# Patient Record
Sex: MALE | Race: BLACK OR AFRICAN AMERICAN | NOT HISPANIC OR LATINO | Employment: UNEMPLOYED | URBAN - METROPOLITAN AREA
[De-identification: names, ages, dates, MRNs, and addresses within clinical notes are randomized per-mention and may not be internally consistent; named-entity substitution may affect disease eponyms.]

---

## 2019-11-04 ENCOUNTER — HOSPITAL ENCOUNTER (EMERGENCY)
Facility: HOSPITAL | Age: 29
Discharge: HOME | End: 2019-11-04
Attending: EMERGENCY MEDICINE
Payer: MEDICAID

## 2019-11-04 VITALS
BODY MASS INDEX: 27.06 KG/M2 | RESPIRATION RATE: 18 BRPM | WEIGHT: 189 LBS | SYSTOLIC BLOOD PRESSURE: 109 MMHG | HEIGHT: 70 IN | OXYGEN SATURATION: 96 % | HEART RATE: 90 BPM | TEMPERATURE: 99 F | DIASTOLIC BLOOD PRESSURE: 62 MMHG

## 2019-11-04 DIAGNOSIS — Z76.0 MEDICATION REFILL: ICD-10-CM

## 2019-11-04 PROCEDURE — 93005 ELECTROCARDIOGRAM TRACING: CPT

## 2019-11-04 PROCEDURE — 96372 THER/PROPH/DIAG INJ SC/IM: CPT

## 2019-11-04 PROCEDURE — 99284 EMERGENCY DEPT VISIT MOD MDM: CPT | Mod: 25

## 2019-11-04 PROCEDURE — 63600175 PHARM REV CODE 636 W HCPCS: Performed by: EMERGENCY MEDICINE

## 2019-11-04 RX ORDER — HALOPERIDOL DECANOATE 50 MG/ML
150 INJECTION INTRAMUSCULAR
Status: COMPLETED | OUTPATIENT
Start: 2019-11-04 | End: 2019-11-04

## 2019-11-04 RX ADMIN — HALOPERIDOL DECANOATE 150 MG: 50 INJECTION INTRAMUSCULAR at 05:11

## 2019-11-04 NOTE — ED NOTES
Pt and family updated on plan of care. Aware that pharmacy does not carry ordered medication. A cab has been sent to get ordered medication from [facility] in [place]. Pt and family aware of delay and verbalized understanding. Awaiting notification from pharmacy when medication arrives.

## 2019-11-04 NOTE — ED PROVIDER NOTES
Encounter Date: 11/4/2019    SCRIBE #1 NOTE: I, Georgette Gonsales and bettina scribing for, and in the presence of, Mark Bennett MD.       History     Chief Complaint   Patient presents with    Medication Refill     was recieving haldol IM while incarcerated for Bipolar      Time seen by provider: 2:20 PM on 11/04/2019    Carlos Castillo is a 29 y.o. male with PMHx of bipolar disorder who presents to the ED for a medication refill. The patient was receiving monthly haldol doses while incarcerated. The last dose he received was 10/11/2019 (24 days ago). He has an appointment with a psychiatrist in 2 days, but his mother reports that they can't prescribe him any medications on his first visit with the psychiatrist. The patient denies any other symptoms at this time. No PSHx. No known drug allergies noted.    The history is provided by the patient, a parent and medical records.     Review of patient's allergies indicates:  No Known Allergies  History reviewed. No pertinent past medical history.  No past surgical history on file.  History reviewed. No pertinent family history.  Social History     Tobacco Use    Smoking status: Not on file   Substance Use Topics    Alcohol use: Not on file    Drug use: Not on file     Review of Systems   Constitutional: Negative for fever.   HENT: Negative for sore throat.    Respiratory: Negative for shortness of breath.    Cardiovascular: Negative for chest pain.   Gastrointestinal: Negative for nausea.   Genitourinary: Negative for dysuria.   Musculoskeletal: Negative for back pain.   Skin: Negative for rash.   Neurological: Negative for weakness.   Hematological: Does not bruise/bleed easily.       Physical Exam     Initial Vitals [11/04/19 1256]   BP Pulse Resp Temp SpO2   109/62 (!) 123 18 99.1 °F (37.3 °C) 96 %      MAP       --         Physical Exam    Nursing note and vitals reviewed.  Constitutional: He appears well-developed and well-nourished. He is not  diaphoretic. No distress.   HENT:   Head: Normocephalic and atraumatic.   Mouth/Throat: Oropharynx is clear and moist.   Eyes: Conjunctivae are normal.   Neck: Neck supple.   Cardiovascular: Normal rate, regular rhythm, normal heart sounds and intact distal pulses. Exam reveals no gallop and no friction rub.    No murmur heard.  Pulmonary/Chest: Breath sounds normal. He has no wheezes. He has no rhonchi. He has no rales.   Abdominal: Soft. He exhibits no distension. There is no tenderness.   Musculoskeletal: Normal range of motion.   Neurological: He is alert and oriented to person, place, and time.   Skin: No rash noted. No erythema.         ED Course   Procedures  Labs Reviewed - No data to display     ECG Results          EKG 12-lead (In process)  Result time 11/04/19 15:00:38    In process by Interface, Lab In [facility] (11/04/19 15:00:38)                 Narrative:    Test Reason : Z76.0,    Vent. Rate : 090 BPM     Atrial Rate : 090 BPM     P-R Int : 146 ms          QRS Dur : 070 ms      QT Int : 312 ms       P-R-T Axes : 057 063 028 degrees     QTc Int : 381 ms    Normal sinus rhythm  ST elevation, consider early repolarization, pericarditis, or injury  Nonspecific ST and T wave abnormality  Abnormal ECG  No previous ECGs available    Referred By: [email or web address]   SELF           Confirmed By:                             Imaging Results    None          Medical Decision Making:   History:   Old Medical Records: I decided to obtain old medical records.  Independently Interpreted Test(s):   I have ordered and independently interpreted EKG Reading(s) - see prior notes  Clinical Tests:   Medical Tests: Ordered and Reviewed            Scribe Attestation:   Scribe #1: I performed the above scribed service and the documentation accurately describes the services I performed. I attest to the accuracy of the note.    I, Dr. Mark Bennett, personally performed the services described in this documentation. All medical  record entries made by the scribe were at my direction and in my presence.  I have reviewed the chart and agree that the record reflects my personal performance and is accurate and complete. Mark Bennett MD.  10:50 PM 11/04/2019    Carlos Castillo is a 29 y.o. male presenting with request of dosage of monthly depot medication haloperidol decanoate.  This was obtained from outside hospital for patient with dose administered after confirmation of dose with family through prior patient records.  This is several days early and slight risk associated with early dosing also reviewed.  They do understand that further doses cannot come from the emergency department and he must establish care as well as further therapy with psychiatrist later this month this plan.  EKG reviewed with no sign of QTC prolongation contraindicating medicine.  Return precautions reviewed.          ED Course as of Nov 04 1744   Mon Nov 04, 2019   1442 EKG:  NSR, rate of 90, normal intervals and axis.  Early repolarization pattern.  No sign of arrhythmia.    [MR]      ED Course User Index  [MR] Mark Bennett MD     Clinical Impression:       ICD-10-CM ICD-9-CM   1. Medication refill Z76.0 V68.1         Disposition:   Disposition: Discharged  Condition: Stable                        Mark Bennett MD  11/04/19 5734